# Patient Record
Sex: FEMALE | Race: WHITE
[De-identification: names, ages, dates, MRNs, and addresses within clinical notes are randomized per-mention and may not be internally consistent; named-entity substitution may affect disease eponyms.]

---

## 2017-03-25 ENCOUNTER — HOSPITAL ENCOUNTER (EMERGENCY)
Dept: HOSPITAL 17 - HOBED | Age: 22
Discharge: HOME | End: 2017-03-25
Payer: MEDICAID

## 2017-03-25 DIAGNOSIS — Z3A.36: ICD-10-CM

## 2017-03-25 DIAGNOSIS — O46.93: Primary | ICD-10-CM

## 2017-03-25 PROCEDURE — 99283 EMERGENCY DEPT VISIT LOW MDM: CPT

## 2017-03-25 NOTE — PD
HPI


Chief Complaint


Vaginal bleeding


Date Seen:  Mar 25, 2017


Time Seen:  00:44


Travel History


International Travel<30 Days:  No


Contact w/Intl Traveler<30Days:  No


Known Affected Area:  No





History of Present Illness


HPI


21-year-old white female  who is 36 weeks and 3 days comes in to the OB ED 

due to vaginal bleeding.  She was seen and the office today vaginal examination 

was performed for which she had some bleeding as a result.  She called the 

nurse who stated that she needed to go directly to the hospital if the bleeding 

lasted for greater than an hour.  Patient went to work that is that she still 

had some brown discharge and she came here for an evaluation.  Denies 

contractions or vaginal discharge and no pain


Para:  0





History


Past Medical History


Medical History:  Denies Significant Hx





Past Surgical History


*** Narrative Surgical


Neck surgery and tonsillectomy





Family History


Family History:  Negative





Social History


Alcohol Use:  No


Tobacco Use:  No


Substance Abuse:  No





Allergies-Medications


(Allergen,Severity, Reaction):  


Coded Allergies:  


     No Known Allergies (Verified , 16)


Home Meds


No Active Prescriptions or Reported Meds





Review of Systems


Except as stated in HPI:  all other systems reviewed are Neg





Physical Exam


Narrative


GENERAL: Well-nourished, well-developed patient.


SKIN: Warm and dry.


HEAD: Normocephalic and atraumatic.


EYES: No scleral icterus. No injection or drainage. 


ENT: No nasal drainage noted. Mucous membranes pink. Airway patent.


NECK: Supple, trachea midline. No JVD.


CARDIOVASCULAR: Regular rate and rhythm without murmurs, gallops, or rubs. 


RESPIRATORY: Breath sounds equal bilaterally. No accessory muscle use.


BREASTS: Bilateral exam showed no masses , no retractions, no nipple discharge.


ABDOMEN/GI: Abdomen soft, non-tender, bowel sounds present, no rebound, no 

guarding 


   Gravid to [-] weeks size


   Fundal Height: [37-]


GENITOURINARY: 


   External Genitalia: intact and normal in appearance


   BUS glands: [-Normal]


   Cervix: [-Posterior]


   Dilatation: [-1]          


   Effacement: 50


   Station: -  3


   Presentation: [Vertex-]        


   Membranes: Intact


   Uterine Contractions: [Absent-]


FHT's: 


   Category: [-1]   


   Baseline: [140-]   


   Reactive: Moderate


   Variability: Moderate


   Decels: Absent


EXTREMITIES: No cyanosis or edema.


BACK: Nontender without obvious deformity. No CVA tenderness.


NEUROLOGICAL: Awake and alert. Motor and sensory grossly within normal limits. 

Five out of 5 muscle strength in all muscle groups. Normal speech.





Data


Data


Vital Signs Reviewed:  Yes





MDM


Plan


36-37 weeks gestation with mild vaginal bleeding earlier today due to cervix 

exam done in office by OB provider


No signs of labor or active bleeding


Discharge with follow up at OB office


Diagnosis


Diagnosis:  


 Primary Impression:  


 Vaginal bleeding during pregnancy, antepartum


 Additional Impression:  


 36 weeks gestation of pregnancy


Disposition:   DISCHARGE HOME


Scripts


No Active Prescriptions or Reported Meds








Natalie Woods MD Mar 25, 2017 00:49

## 2017-05-03 ENCOUNTER — HOSPITAL ENCOUNTER (EMERGENCY)
Dept: HOSPITAL 17 - PHED | Age: 22
Discharge: HOME | End: 2017-05-03
Payer: MEDICAID

## 2017-05-03 VITALS
DIASTOLIC BLOOD PRESSURE: 88 MMHG | SYSTOLIC BLOOD PRESSURE: 133 MMHG | HEART RATE: 121 BPM | TEMPERATURE: 100.2 F | RESPIRATION RATE: 18 BRPM | OXYGEN SATURATION: 99 %

## 2017-05-03 VITALS — BODY MASS INDEX: 28.15 KG/M2 | WEIGHT: 164.91 LBS | HEIGHT: 64 IN

## 2017-05-03 VITALS
SYSTOLIC BLOOD PRESSURE: 133 MMHG | DIASTOLIC BLOOD PRESSURE: 88 MMHG | OXYGEN SATURATION: 99 % | RESPIRATION RATE: 18 BRPM | TEMPERATURE: 100.2 F | HEART RATE: 121 BPM

## 2017-05-03 DIAGNOSIS — R10.30: ICD-10-CM

## 2017-05-03 DIAGNOSIS — N39.0: Primary | ICD-10-CM

## 2017-05-03 LAB
BACTERIA #/AREA URNS HPF: (no result) /HPF
COLOR UR: (no result)
COMMENT (UR): (no result)
CULTURE IF INDICATED: (no result)
GLUCOSE UR STRIP-MCNC: (no result) MG/DL
HGB UR QL STRIP: (no result)
KETONES UR STRIP-MCNC: (no result) MG/DL
MUCOUS THREADS #/AREA URNS LPF: (no result) /LPF
NITRITE UR QL STRIP: (no result)
SP GR UR STRIP: 1.02 (ref 1–1.03)
SQUAMOUS #/AREA URNS HPF: > 8 /HPF (ref 0–5)

## 2017-05-03 PROCEDURE — 99283 EMERGENCY DEPT VISIT LOW MDM: CPT

## 2017-05-03 PROCEDURE — 87070 CULTURE OTHR SPECIMN AEROBIC: CPT

## 2017-05-03 PROCEDURE — 81001 URINALYSIS AUTO W/SCOPE: CPT

## 2017-05-03 PROCEDURE — 87205 SMEAR GRAM STAIN: CPT

## 2017-05-03 PROCEDURE — 87086 URINE CULTURE/COLONY COUNT: CPT

## 2017-05-03 NOTE — PD
HPI


Chief Complaint:  GI Complaint


Time Seen by Provider:  22:28


Travel History


International Travel<30 days:  No


Contact w/Intl Traveler<30days:  No


Traveled to known affect area:  No





History of Present Illness


HPI


This 21-year-old female is complaining of chills. She has had some pain with 

urinating.  She's had some lower abdominal pain.  She had a baby one week ago 

by vaginal delivery.  She is having lower abdominal pain.  Pain is moderate in 

intensity scleral





PFSH


Past Medical History


Diminished Hearing:  No


Immunizations Current:  Yes


Tetanus Vaccination:  Unknown


Influenza Vaccination:  No


Pregnant?:  Not Pregnant


LMP:  BLEEDING NOW POST PARTUM


:  2


Para:  0


Miscarriage:  1





Past Surgical History


Tonsillectomy:  Yes





Social History


Alcohol Use:  No


Tobacco Use:  No


Substance Use:  No





Allergies-Medications


(Allergen,Severity, Reaction):  


Coded Allergies:  


     No Known Allergies (Verified , 5/3/17)


Reported Meds & Prescriptions





Reported Meds & Active Scripts


Active


Reported


Oxycodone (Oxycodone HCl) 10 Mg Tab 10 Mg PO Q6H PRN








Review of Systems


General / Constitutional:  Positive: Fever, Chills


Eyes:  No: Diploplia


HENT:  No: Headaches


Cardiovascular:  No: Chest Pain or Discomfort


Respiratory:  No: Cough, Shortness of Breath


Gastrointestinal:  No: Nausea


Genitourinary:  Positive: Frequency, Dysuria, Pelvic Pain


Musculoskeletal:  No: Myalgias


Skin:  No Rash





Physical Exam


Narrative


GENERAL: Well-developed female


SKIN: Focused skin assessment warm/dry.


HEAD: Atraumatic. Normocephalic. 


EYES: Pupils equal and round. No scleral icterus. No injection or drainage. 


ENT: No nasal bleeding or discharge.  Mucous membranes pink and moist.


NECK: Trachea midline. No JVD. 


CARDIOVASCULAR: Regular rate and rhythm.  No murmur appreciated.


RESPIRATORY: No accessory muscle use. Clear to auscultation. Breath sounds 

equal bilaterally. 


GASTROINTESTINAL: Abdomen soft, non-tender, nondistended. Hepatic and splenic 

margins not palpable. 


Pelvic: There is some bleeding and some discharge.  There is pain with 

palpation of the uterus and in his chest.


MUSCULOSKELETAL: No obvious deformities. No clubbing.  No cyanosis.  No edema. 


NEUROLOGICAL: Awake and alert. No obvious cranial nerve deficits.  Motor 

grossly within normal limits. Normal speech.


PSYCHIATRIC: Appropriate mood and affect; insight and judgment normal.





Data


Data


Last Documented VS





Vital Signs








  Date Time  Temp Pulse Resp B/P Pulse Ox O2 Delivery O2 Flow Rate FiO2


 


5/3/17 22:57   18     


 


5/3/17 22:46 100.2 121  133/88 99   








Orders





 Urinalysis - C+S If Indicated (5/3/17 22:29)


Urine Culture (5/3/17 22:40)





Labs





 Laboratory Tests








Test 5/3/17





 22:40


 


Urine Color PINK 


 


Urine Turbidity CLOUDY 


 


Urine pH 7.0 


 


Urine Specific Gravity 1.020 


 


Urine Protein 100 mg/dL


 


Urine Glucose (UA) NEG mg/dL


 


Urine Ketones NEG mg/dL


 


Urine Occult Blood LARGE 


 


Urine Nitrite NEG 


 


Urine Bilirubin NEG 


 


Urine Leukocyte Esterase MOD 


 


Urine RBC 10-14 /hpf


 


Urine WBC 20-24 /hpf


 


Urine Squamous Epithelial > 8 /hpf





Cells 


 


Urine Amorphous Sediment FEW 


 


Urine Bacteria MOD /hpf


 


Urine Mucus FEW /lpf


 


Microscopic Urinalysis Comment CULTURE





 INDICATED











MDM


Medical Decision Making


Medical Screen Exam Complete:  Yes


Emergency Medical Condition:  Yes


Medical Record Reviewed:  Yes


Differential Diagnosis


Differential includes UTI, endometritis


Narrative Course


Patient does have a UTI and will be treated with Augmentin as this would also 

treat her possible endometritis.





Diagnosis





 Primary Impression:  


 Urinary tract infection


 Qualified Code:  N30.01 - Acute cystitis with hematuria


Scripts


Amoxicillin-Clavulanate (Augmentin)875-125 mg [id number] Mg PO BID  10 Days  Ref 0


   not for use in CrCl <30 ml/min.


   Prov:Jimmy Blood MD         5/3/17


Disposition:  01 DISCHARGE HOME


Condition:  Stable








Jimmy Blood MD May 3, 2017 23:10

## 2018-05-13 ENCOUNTER — HOSPITAL ENCOUNTER (EMERGENCY)
Dept: HOSPITAL 17 - PHED | Age: 23
Discharge: HOME | End: 2018-05-13
Payer: MEDICAID

## 2018-05-13 VITALS — RESPIRATION RATE: 17 BRPM

## 2018-05-13 VITALS
SYSTOLIC BLOOD PRESSURE: 119 MMHG | TEMPERATURE: 98.1 F | RESPIRATION RATE: 16 BRPM | HEART RATE: 107 BPM | OXYGEN SATURATION: 100 % | DIASTOLIC BLOOD PRESSURE: 60 MMHG

## 2018-05-13 VITALS — OXYGEN SATURATION: 99 %

## 2018-05-13 VITALS
SYSTOLIC BLOOD PRESSURE: 111 MMHG | RESPIRATION RATE: 18 BRPM | OXYGEN SATURATION: 98 % | HEART RATE: 94 BPM | DIASTOLIC BLOOD PRESSURE: 66 MMHG

## 2018-05-13 VITALS — DIASTOLIC BLOOD PRESSURE: 56 MMHG | SYSTOLIC BLOOD PRESSURE: 96 MMHG

## 2018-05-13 DIAGNOSIS — M54.6: ICD-10-CM

## 2018-05-13 DIAGNOSIS — N83.201: ICD-10-CM

## 2018-05-13 DIAGNOSIS — R11.2: ICD-10-CM

## 2018-05-13 DIAGNOSIS — N10: Primary | ICD-10-CM

## 2018-05-13 LAB
ALBUMIN SERPL-MCNC: 2.9 GM/DL (ref 3.4–5)
ALP SERPL-CCNC: 83 U/L (ref 45–117)
ALT SERPL-CCNC: 16 U/L (ref 10–53)
AST SERPL-CCNC: 11 U/L (ref 15–37)
BACTERIA #/AREA URNS HPF: (no result) /HPF
BASOPHILS # BLD AUTO: 0.1 TH/MM3 (ref 0–0.2)
BASOPHILS NFR BLD: 0.7 % (ref 0–2)
BILIRUB SERPL-MCNC: 0.7 MG/DL (ref 0.2–1)
BUN SERPL-MCNC: 9 MG/DL (ref 7–18)
CALCIUM SERPL-MCNC: 7.9 MG/DL (ref 8.5–10.1)
CHLORIDE SERPL-SCNC: 109 MEQ/L (ref 98–107)
COLOR UR: (no result)
CREAT SERPL-MCNC: 0.67 MG/DL (ref 0.5–1)
EOSINOPHIL # BLD: 0.1 TH/MM3 (ref 0–0.4)
EOSINOPHIL NFR BLD: 0.5 % (ref 0–4)
ERYTHROCYTE [DISTWIDTH] IN BLOOD BY AUTOMATED COUNT: 13.1 % (ref 11.6–17.2)
GFR SERPLBLD BASED ON 1.73 SQ M-ARVRAT: 110 ML/MIN (ref 89–?)
GLUCOSE SERPL-MCNC: 102 MG/DL (ref 74–106)
GLUCOSE UR STRIP-MCNC: (no result) MG/DL
HCO3 BLD-SCNC: 23.6 MEQ/L (ref 21–32)
HCT VFR BLD CALC: 40.4 % (ref 35–46)
HGB BLD-MCNC: 13.6 GM/DL (ref 11.6–15.3)
HGB UR QL STRIP: (no result)
INR PPP: 1.1 RATIO
KETONES UR STRIP-MCNC: (no result) MG/DL
LEUKOCYTE ESTERASE UR QL STRIP: (no result) /HPF (ref 0–5)
LYMPHOCYTES # BLD AUTO: 1 TH/MM3 (ref 1–4.8)
LYMPHOCYTES NFR BLD AUTO: 8.6 % (ref 9–44)
MCH RBC QN AUTO: 28.8 PG (ref 27–34)
MCHC RBC AUTO-ENTMCNC: 33.7 % (ref 32–36)
MCV RBC AUTO: 85.5 FL (ref 80–100)
MONOCYTE #: 0.5 TH/MM3 (ref 0–0.9)
MONOCYTES NFR BLD: 4.9 % (ref 0–8)
NEUTROPHILS # BLD AUTO: 9.5 TH/MM3 (ref 1.8–7.7)
NEUTROPHILS NFR BLD AUTO: 85.3 % (ref 16–70)
NITRITE UR QL STRIP: (no result)
PLATELET # BLD: 309 TH/MM3 (ref 150–450)
PMV BLD AUTO: 8.2 FL (ref 7–11)
PROT SERPL-MCNC: 6.1 GM/DL (ref 6.4–8.2)
PROTHROMBIN TIME: 10.8 SEC (ref 9.8–11.6)
RBC # BLD AUTO: 4.73 MIL/MM3 (ref 4–5.3)
RBC #/AREA URNS HPF: (no result) /HPF (ref 0–3)
SODIUM SERPL-SCNC: 138 MEQ/L (ref 136–145)
SP GR UR STRIP: (no result) (ref 1–1.03)
SQUAMOUS #/AREA URNS HPF: > 8 /HPF (ref 0–5)
URINE LEUKOCYTE ESTERASE: (no result)
WBC # BLD AUTO: 11.2 TH/MM3 (ref 4–11)

## 2018-05-13 PROCEDURE — 87086 URINE CULTURE/COLONY COUNT: CPT

## 2018-05-13 PROCEDURE — 72072 X-RAY EXAM THORAC SPINE 3VWS: CPT

## 2018-05-13 PROCEDURE — 80053 COMPREHEN METABOLIC PANEL: CPT

## 2018-05-13 PROCEDURE — 96375 TX/PRO/DX INJ NEW DRUG ADDON: CPT

## 2018-05-13 PROCEDURE — 99284 EMERGENCY DEPT VISIT MOD MDM: CPT

## 2018-05-13 PROCEDURE — 85730 THROMBOPLASTIN TIME PARTIAL: CPT

## 2018-05-13 PROCEDURE — 84703 CHORIONIC GONADOTROPIN ASSAY: CPT

## 2018-05-13 PROCEDURE — 96365 THER/PROPH/DIAG IV INF INIT: CPT

## 2018-05-13 PROCEDURE — 85025 COMPLETE CBC W/AUTO DIFF WBC: CPT

## 2018-05-13 PROCEDURE — 81001 URINALYSIS AUTO W/SCOPE: CPT

## 2018-05-13 PROCEDURE — 85610 PROTHROMBIN TIME: CPT

## 2018-05-13 PROCEDURE — 74177 CT ABD & PELVIS W/CONTRAST: CPT

## 2018-05-13 PROCEDURE — 96361 HYDRATE IV INFUSION ADD-ON: CPT

## 2018-05-13 PROCEDURE — 83690 ASSAY OF LIPASE: CPT

## 2018-05-13 NOTE — PD
HPI


Chief Complaint:  GI Complaint


Time Seen by Provider:  16:44


Travel History


International Travel<30 days:  No


Contact w/Intl Traveler<30days:  No


Traveled to known affect area:  No





History of Present Illness


HPI


22-year-old female here for evaluation of abdominal pain, nausea, and vomiting.

  Symptoms started yesterday evening and have persisted throughout the night.  

Patient reports that her vomiting was initially bilious, now is more clear, 

nonbloody.  She complains of diffuse abdominal cramping.  Last bowel movement 

was about 2 hours prior to arrival and was formed.  No history of abdominal 

surgeries.  She has had subjective fevers and chills.  She also complains of 

mid back pain described as an ache.  She reports that she had the pain 2 days 

ago in her back, it then subsided, then returned today.  Pain is sharp, constant

, worse with movements.  No urinary symptoms.  No vaginal bleeding or 

discharge. Denies IVDU.





PFSH


Past Medical History


Diminished Hearing:  No


Immunizations Current:  Yes


Pregnant?:  Unknown


LMP:  18


:  2


Para:  0


Miscarriage:  1





Past Surgical History


Tonsillectomy:  Yes





Social History


Alcohol Use:  No


Tobacco Use:  No


Substance Use:  No





Allergies-Medications


(Allergen,Severity, Reaction):  


Coded Allergies:  


     No Known Allergies (Verified  Adverse Reaction, Unknown, 18)


Reported Meds & Prescriptions





Reported Meds & Active Scripts


Active








Review of Systems


Except as stated in HPI:  all other systems reviewed are Neg





Physical Exam


Narrative


GENERAL: Well-developed, well-nourished, no apparent distress.


SKIN: Focused skin assessment warm/dry.


HEAD: Atraumatic. Normocephalic. 


EYES: Pupils equal and round. No scleral icterus. No injection or drainage. 


ENT: Mucous membranes pink and dry.


NECK: Trachea midline. No JVD. 


CARDIOVASCULAR: Regular rate and rhythm.  No murmur appreciated.


RESPIRATORY: No accessory muscle use. Clear to auscultation. Breath sounds 

equal bilaterally. 


GASTROINTESTINAL: Abdomen soft, nondistended.  Mild diffuse tenderness without 

peritoneal signs.  Normal bowel sounds.  No hernias.


MUSCULOSKELETAL: No obvious deformities. No clubbing.  No cyanosis.  No edema.  

No midline vertebral step-off or tenderness.  No CVA tenderness.


NEUROLOGICAL: Awake and alert. No obvious cranial nerve deficits.  Motor 

grossly within normal limits. Normal speech.


PSYCHIATRIC: Appropriate mood and affect; insight and judgment normal.





Data


Data


Last Documented VS





Vital Signs








  Date Time  Temp Pulse Resp B/P (MAP) Pulse Ox O2 Delivery O2 Flow Rate FiO2


 


18 18:14   17     


 


18 18:00  94  111/66 (81) 98 Room Air  


 


18 16:22 98.1       








Orders





 Orders


Complete Blood Count With Diff (18 16:53)


Comprehensive Metabolic Panel (18 16:53)


Lipase (18 16:53)


Prothrombin Time / Inr (Pt) (18 16:53)


Act Partial Throm Time (Ptt) (18 16:53)


Urinalysis - C+S If Indicated (18 16:53)


Iv Access Insert/Monitor (18 16:53)


Ecg Monitoring (18 16:53)


Oximetry (18 16:53)


Sodium Chlor 0.9% 1000 Ml Inj (Ns 1000 M (18 16:53)


Sodium Chloride 0.9% Flush (Ns Flush) (18 17:00)


Ed Urine Pregnancytest Poc (18 16:53)


Ondansetron  Odt (Zofran  Odt) (18 17:00)


Ketorolac Inj (Toradol Inj) (18 17:15)


Al-Mag Hy-Si 40-40-4 Mg/Ml Liq (Mag-Al P (18 17:45)


Lidocaine 2% Viscous (Xylocaine 2% Visco (18 17:45)


Urine Culture (18 16:53)


Ceftriaxone Inj (Rocephin Inj) (18 18:30)


Morphine Inj (Morphine Inj) (18 18:30)


Ct Abd/Pel W Iv Contrast(Rout) (18 )


Diphenhydramine Inj (Benadryl Inj) (18 19:00)


Iohexol 350 Inj (Omnipaque 350 Inj) (18 19:04)


Spine, Thoracic-Ap/Lat/Sw(3vw) (18 )





Labs





Laboratory Tests








Test


  5/13/18


16:53 18


17:10 18


17:50


 


Urine Color STRAW   


 


Urine Turbidity SL CLOUDY   


 


Urine pH 7.0   


 


Urine Specific Gravity


  LESS/EQUAL


1.005 


  


 


 


Urine Protein NEG mg/dL   


 


Urine Glucose (UA) NEG mg/dL   


 


Urine Ketones NEG mg/dL   


 


Urine Occult Blood NEG   


 


Urine Nitrite NEG   


 


Urine Bilirubin NEG   


 


Urine Urobilinogen 0.2 MG/DL   


 


Urine Leukocyte Esterase MOD   


 


Urine RBC 0-2 /hpf   


 


Urine WBC 15-19 /hpf   


 


Urine Squamous Epithelial


Cells > 8 /hpf 


  


  


 


 


Urine Bacteria FEW /hpf   


 


Microscopic Urinalysis Comment


  CULTURE


INDICATED 


  


 


 


White Blood Count  11.2 TH/MM3  


 


Red Blood Count  4.73 MIL/MM3  


 


Hemoglobin  13.6 GM/DL  


 


Hematocrit  40.4 %  


 


Mean Corpuscular Volume  85.5 FL  


 


Mean Corpuscular Hemoglobin  28.8 PG  


 


Mean Corpuscular Hemoglobin


Concent 


  33.7 % 


  


 


 


Red Cell Distribution Width  13.1 %  


 


Platelet Count  309 TH/MM3  


 


Mean Platelet Volume  8.2 FL  


 


Neutrophils (%) (Auto)  85.3 %  


 


Lymphocytes (%) (Auto)  8.6 %  


 


Monocytes (%) (Auto)  4.9 %  


 


Eosinophils (%) (Auto)  0.5 %  


 


Basophils (%) (Auto)  0.7 %  


 


Neutrophils # (Auto)  9.5 TH/MM3  


 


Lymphocytes # (Auto)  1.0 TH/MM3  


 


Monocytes # (Auto)  0.5 TH/MM3  


 


Eosinophils # (Auto)  0.1 TH/MM3  


 


Basophils # (Auto)  0.1 TH/MM3  


 


CBC Comment  AUTO DIFF  


 


Differential Comment


  


  AUTO DIFF


CONFIRMED 


 


 


Platelet Estimate  NORMAL  


 


Platelet Morphology Comment  NORMAL  


 


Red Cell Morphology Comment  NORMAL  


 


Prothrombin Time  10.8 SEC  


 


Prothromb Time International


Ratio 


  1.1 RATIO 


  


 


 


Activated Partial


Thromboplast Time 


  29.2 SEC 


  


 


 


Blood Urea Nitrogen   9 MG/DL 


 


Creatinine   0.67 MG/DL 


 


Random Glucose   102 MG/DL 


 


Total Protein   6.1 GM/DL 


 


Albumin   2.9 GM/DL 


 


Calcium Level   7.9 MG/DL 


 


Alkaline Phosphatase   83 U/L 


 


Aspartate Amino Transf


(AST/SGOT) 


  


  11 U/L 


 


 


Alanine Aminotransferase


(ALT/SGPT) 


  


  16 U/L 


 


 


Total Bilirubin   0.7 MG/DL 


 


Sodium Level   138 MEQ/L 


 


Potassium Level   3.5 MEQ/L 


 


Chloride Level   109 MEQ/L 


 


Carbon Dioxide Level   23.6 MEQ/L 


 


Anion Gap   5 MEQ/L 


 


Estimat Glomerular Filtration


Rate 


  


  110 ML/MIN 


 


 


Lipase   68 U/L 











[facility]


Medical Decision Making


Medical Screen Exam Complete:  Yes


Emergency Medical Condition:  Yes


Differential Diagnosis


Gastroenteritis, gastritis, pancreatitis, hepatobiliary disease, dehydration/

metabolic abnormality, musculoskeletal back strain from vomiting.


Narrative Course


Initial vital signs show heart rate 107, blood pressure 119/60, pulse ox 100% 

on room air, oral temperature 98.1F.





CBC: WBC 11.2, hemoglobin 13.6, hematocrit 40.4, platelets 309, neutrophils 85%.


CMP is essentially unremarkable.


Lipase is 68.





Urine pregnancy is negative.





UA: Cloudy, moderate leukocyte esterase, 15-19 WBCs, greater than 8 epithelial 

cells, few bacteria, culture indicated.





CT abdomen pelvis:


CONCLUSION:     


1. No acute inflammatory process.


2. Functional follicle/hemorrhagic cyst right ovary measures 2 cm.





The patient was given 30 mg of IV Toradol with initial improvement in back pain

, however soon after returning.  She was given 4 mg of IV morphine and shortly 

afterwards began to have pruritus.  She was given Benadryl for this.





Patient was made aware of all findings.  She still complains of mid/upper back 

pain.  X-ray of the T-spine will be ordered.





X-ray thoracic spine:


CONCLUSION:     


Unremarkable thoracic spine.





Patient was made aware of all findings.  She is resting comfortably, no acute 

distress.  She states that while she was at x-ray with certain positions her 

back hurt worse.  This makes me think that it is more likely musculoskeletal in 

nature.  Her pain could be secondary to vomiting and musculoskeletal strain.  

It could also be secondary to pyelonephritis.  Either way, the patient is 

stable for discharge home with outpatient follow-up with a primary care 

physician this week.  I will give her a prescription for tramadol, ibuprofen, 

Macrobid, and Zofran.  She reports that she has a gynecologist whom she can 

follow-up with.  I also advised that she follow-up with a primary care 

physician this week.  She was advised on when to return to the emergency 

department.  She verbalizes understanding and agreement with plan.





Diagnosis





 Primary Impression:  


 UTI (urinary tract infection)


 Qualified Codes:  N10 - Acute pyelonephritis


 Additional Impressions:  


 Nausea and vomiting


 Qualified Codes:  R11.2 - Nausea with vomiting, unspecified


 Back pain


 Qualified Codes:  M54.6 - Pain in thoracic spine


 Ovarian cyst


 Qualified Codes:  N83.201 - Unspecified ovarian cyst, right side


 Abdominal pain


 Qualified Codes:  R10.84 - Generalized abdominal pain


Referrals:  


[facility]


3 days





Primary Care Physician


3 days





***Additional Instructions:  


Follow-up with a primary care physician this week.


Follow-up with your gynecologist this week.


Take medications as prescribed.


Return to the emergency department for worsening symptoms or any other concerns.


Scripts


Tramadol (Tramadol) 50 Mg Tab


50 MG PO Q8H Y for PAIN, #10 TAB 0 Refills


   Prov: Colton Shearer MD         18 


Nitrofurantoin Monohydrate Macrocrystals (Macrobid) 100 Mg Cap


100 MG PO BID for Infection for 7 Days, #14 CAP 0 Refills


   Prov: Colton Shearer MD         18 


Ibuprofen (Ibuprofen) 600 Mg Tab


600 MG PO Q8H Y for PAIN for 10 Days, #30 TAB 0 Refills


   Prov: Colton Shearer MD         18 


Ondansetron Odt (Zofran Odt) 4 Mg Tab


4 MG SL Q8HR Y for Nausea/Vomiting, #20 TAB 0 Refills


   Prov: Colton Shearer MD         18


Disposition:  01 DISCHARGE HOME


Condition:  Stable











Colton Shearer MD May 13, 2018 17:01

## 2018-05-13 NOTE — RADRPT
EXAM DATE/TIME:  05/13/2018 18:51 

 

[facility] COMPARISON:     

No previous studies available for comparison.

 

 

INDICATIONS :     

Diffuse abdominal pain.

                      

 

IV CONTRAST:     

95 cc Omnipaque 350 (iohexol) IV 

 

 

ORAL CONTRAST:      

No oral contrast ingested.

                      

 

RADIATION DOSE:     

16.17 CTDIvol (mGy) 

 

 

MEDICAL HISTORY :     

None  

 

SURGICAL HISTORY :      

Tonsillectomy. 

 

ENCOUNTER:      

Initial

 

ACUITY:      

2 days

 

PAIN SCALE:      

8/10

 

LOCATION:       

Bilateral pelvis abdomen

 

TECHNIQUE:     

Volumetric scanning of the abdomen and pelvis was performed.  Using automated exposure control and ad
justment of the mA and/or kV according to patient size, radiation dose was kept as low as reasonably 
achievable to obtain optimal diagnostic quality images.  DICOM format image data is available electro
nically for review and comparison.  

 

FINDINGS:     

 

LOWER LUNGS:     

The visualized lower lungs are clear.

 

LIVER:     

Homogeneous density without lesion.  There is no dilation of the biliary tree.  No calcified gallston
es.

 

SPLEEN:     

Normal size without lesion.

 

PANCREAS:     

Within normal limits.

 

KIDNEYS:     

Normal in size and shape.  There is no mass, stone or hydronephrosis.

 

ADRENAL GLANDS:     

Within normal limits.

 

VASCULAR:     

There is no aortic aneurysm.

 

BOWEL/MESENTERY:     

The stomach, small bowel, and colon demonstrate no acute abnormality.  There is no free intraperitone
al air or fluid. Normal appendix.

 

ABDOMINAL WALL:     

Within normal limits.

 

RETROPERITONEUM:     

There is no lymphadenopathy. 

 

BLADDER:     

No wall thickening or mass. 

 

REPRODUCTIVE:     

Functional follicle/hemorrhagic cyst right ovary measures 2 cm.

 

INGUINAL:     

There is no lymphadenopathy or hernia. 

 

MUSCULOSKELETAL:     

Within normal limits for patient age. 

 

CONCLUSION:     

1. No acute inflammatory process.

2. Functional follicle/hemorrhagic cyst right ovary measures 2 cm.

 

 

 

 Raphael Roman MD on May 13, 2018 at 19:06           

Board Certified Radiologist.

 This report was verified electronically.

## 2018-05-13 NOTE — RADRPT
EXAM DATE/TIME:  05/13/2018 19:42 

 

[facility] COMPARISON:     

No previous studies available for comparison.

 

                     

INDICATIONS :     

Upper back pain, no known trauma.

                     

 

MEDICAL HISTORY :     

None.          

 

SURGICAL HISTORY :     

None.   

 

ENCOUNTER:     

Initial                                        

 

ACUITY:     

2 days      

 

PAIN SCORE:     

6/10

 

LOCATION:       

thoracic spine.

 

FINDINGS:     

There is normal alignment of the thoracic vertebral bodies.  Vertebral body height is maintained.  No
 evidence of fracture or subluxation.  Pedicles are intact at all levels.  The paravertebral reflecti
ons are not thickened. 

 

CONCLUSION:     

Unremarkable thoracic spine.

 

 

 

 Raphael Roman MD on May 13, 2018 at 20:14           

Board Certified Radiologist.

 This report was verified electronically.

## 2018-05-18 ENCOUNTER — HOSPITAL ENCOUNTER (EMERGENCY)
Age: 23
LOS: 1 days | Discharge: HOME | End: 2018-05-19

## 2018-05-18 DIAGNOSIS — R14.0: ICD-10-CM

## 2018-05-18 DIAGNOSIS — N30.00: ICD-10-CM

## 2018-05-18 DIAGNOSIS — M54.6: Primary | ICD-10-CM

## 2018-05-18 DIAGNOSIS — R10.30: ICD-10-CM

## 2018-05-18 DIAGNOSIS — N83.209: ICD-10-CM

## 2018-05-18 DIAGNOSIS — R11.0: ICD-10-CM

## 2018-05-18 PROCEDURE — 85610 PROTHROMBIN TIME: CPT

## 2018-05-18 PROCEDURE — 87210 SMEAR WET MOUNT SALINE/INK: CPT

## 2018-05-18 PROCEDURE — 84703 CHORIONIC GONADOTROPIN ASSAY: CPT

## 2018-05-18 PROCEDURE — 85025 COMPLETE CBC W/AUTO DIFF WBC: CPT

## 2018-05-18 PROCEDURE — 96374 THER/PROPH/DIAG INJ IV PUSH: CPT

## 2018-05-18 PROCEDURE — 99285 EMERGENCY DEPT VISIT HI MDM: CPT

## 2018-05-18 PROCEDURE — 87491 CHLMYD TRACH DNA AMP PROBE: CPT

## 2018-05-18 PROCEDURE — 87591 N.GONORRHOEAE DNA AMP PROB: CPT

## 2018-05-18 PROCEDURE — 76856 US EXAM PELVIC COMPLETE: CPT

## 2018-05-18 PROCEDURE — 81001 URINALYSIS AUTO W/SCOPE: CPT

## 2018-05-18 PROCEDURE — 96361 HYDRATE IV INFUSION ADD-ON: CPT

## 2018-05-18 PROCEDURE — 96375 TX/PRO/DX INJ NEW DRUG ADDON: CPT

## 2018-05-18 PROCEDURE — 80053 COMPREHEN METABOLIC PANEL: CPT

## 2018-05-18 PROCEDURE — 85730 THROMBOPLASTIN TIME PARTIAL: CPT

## 2018-05-18 PROCEDURE — 83690 ASSAY OF LIPASE: CPT
